# Patient Record
Sex: MALE | Race: WHITE | NOT HISPANIC OR LATINO
[De-identification: names, ages, dates, MRNs, and addresses within clinical notes are randomized per-mention and may not be internally consistent; named-entity substitution may affect disease eponyms.]

---

## 2023-12-07 ENCOUNTER — APPOINTMENT (OUTPATIENT)
Dept: FAMILY MEDICINE | Facility: CLINIC | Age: 88
End: 2023-12-07
Payer: MEDICARE

## 2023-12-07 ENCOUNTER — NON-APPOINTMENT (OUTPATIENT)
Age: 88
End: 2023-12-07

## 2023-12-07 VITALS
DIASTOLIC BLOOD PRESSURE: 70 MMHG | HEIGHT: 68 IN | HEART RATE: 65 BPM | WEIGHT: 173 LBS | OXYGEN SATURATION: 97 % | BODY MASS INDEX: 26.22 KG/M2 | SYSTOLIC BLOOD PRESSURE: 130 MMHG

## 2023-12-07 DIAGNOSIS — Z85.46 PERSONAL HISTORY OF MALIGNANT NEOPLASM OF PROSTATE: ICD-10-CM

## 2023-12-07 DIAGNOSIS — E78.5 HYPERLIPIDEMIA, UNSPECIFIED: ICD-10-CM

## 2023-12-07 DIAGNOSIS — R05.9 COUGH, UNSPECIFIED: ICD-10-CM

## 2023-12-07 DIAGNOSIS — Z86.711 PERSONAL HISTORY OF PULMONARY EMBOLISM: ICD-10-CM

## 2023-12-07 DIAGNOSIS — R21 RASH AND OTHER NONSPECIFIC SKIN ERUPTION: ICD-10-CM

## 2023-12-07 DIAGNOSIS — E03.9 HYPOTHYROIDISM, UNSPECIFIED: ICD-10-CM

## 2023-12-07 DIAGNOSIS — L85.3 XEROSIS CUTIS: ICD-10-CM

## 2023-12-07 DIAGNOSIS — R32 UNSPECIFIED URINARY INCONTINENCE: ICD-10-CM

## 2023-12-07 DIAGNOSIS — Z82.49 FAMILY HISTORY OF ISCHEMIC HEART DISEASE AND OTHER DISEASES OF THE CIRCULATORY SYSTEM: ICD-10-CM

## 2023-12-07 DIAGNOSIS — R09.81 NASAL CONGESTION: ICD-10-CM

## 2023-12-07 DIAGNOSIS — K57.32 DIVERTICULITIS OF LARGE INTESTINE W/OUT PERFORATION OR ABSCESS W/OUT BLEEDING: ICD-10-CM

## 2023-12-07 DIAGNOSIS — E80.6 OTHER DISORDERS OF BILIRUBIN METABOLISM: ICD-10-CM

## 2023-12-07 DIAGNOSIS — I25.2 OLD MYOCARDIAL INFARCTION: ICD-10-CM

## 2023-12-07 DIAGNOSIS — N20.0 CALCULUS OF KIDNEY: ICD-10-CM

## 2023-12-07 DIAGNOSIS — B37.7 CANDIDAL SEPSIS: ICD-10-CM

## 2023-12-07 DIAGNOSIS — R26.89 OTHER ABNORMALITIES OF GAIT AND MOBILITY: ICD-10-CM

## 2023-12-07 DIAGNOSIS — Z00.00 ENCOUNTER FOR GENERAL ADULT MEDICAL EXAMINATION W/OUT ABNORMAL FINDINGS: ICD-10-CM

## 2023-12-07 DIAGNOSIS — H01.009 UNSPECIFIED BLEPHARITIS UNSPECIFIED EYE, UNSPECIFIED EYELID: ICD-10-CM

## 2023-12-07 DIAGNOSIS — Z82.3 FAMILY HISTORY OF STROKE: ICD-10-CM

## 2023-12-07 DIAGNOSIS — F03.90 UNSPECIFIED DEMENTIA W/OUT BEHAVIORAL DISTURBANCE: ICD-10-CM

## 2023-12-07 PROCEDURE — 36415 COLL VENOUS BLD VENIPUNCTURE: CPT

## 2023-12-07 PROCEDURE — 99204 OFFICE O/P NEW MOD 45 MIN: CPT

## 2023-12-07 PROCEDURE — G0439: CPT

## 2023-12-08 ENCOUNTER — TRANSCRIPTION ENCOUNTER (OUTPATIENT)
Age: 88
End: 2023-12-08

## 2023-12-29 ENCOUNTER — APPOINTMENT (OUTPATIENT)
Dept: FAMILY MEDICINE | Facility: CLINIC | Age: 88
End: 2023-12-29
Payer: MEDICARE

## 2023-12-29 VITALS
DIASTOLIC BLOOD PRESSURE: 70 MMHG | HEIGHT: 68 IN | WEIGHT: 170 LBS | SYSTOLIC BLOOD PRESSURE: 110 MMHG | BODY MASS INDEX: 25.76 KG/M2 | HEART RATE: 80 BPM | OXYGEN SATURATION: 97 %

## 2023-12-29 PROCEDURE — 99213 OFFICE O/P EST LOW 20 MIN: CPT

## 2023-12-29 RX ORDER — KRILL/OM-3/DHA/EPA/PHOSPHO/AST 1000-230MG
81 CAPSULE ORAL
Refills: 0 | Status: ACTIVE | COMMUNITY

## 2024-01-04 NOTE — ASSESSMENT
[FreeTextEntry1] : 89 year old male with history of dementia, complicated diverticulitis with perforation now s/p partial colectomy with colostomy placement in 4/2023 at [facility], HLD, hypothyroidism. 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 CT abd to evaluate for underlying infection  Provided with GI and general surgery referrals for further management.  Return precautions discussed with wife and she is in agreement with plan.

## 2024-01-04 NOTE — HISTORY OF PRESENT ILLNESS
[de-identified] : 89 year old male who presents with wife due to concerns of abdominal bulge relating to colostomy bag. Patient with history of dementia and wife present to provide further history.  States last week noticed right abd bulge associated with 2 days of decreased colostomy output. She gave prune juice and coffee which helped increase stool output.  History of diverticulitis complicated perforated colon now s/p partial colectomy with colostomy placement in 4/2023 at [facility]. Patient and his wife recently moved to the area and wife states they have not had any surgery/GI evaluations since surgery. She is the one maintaining colostomy bag.  Denies any fevers, weight loss, nausea, vomiting, blood in colostomy bag, abdominal pain.

## 2024-01-04 NOTE — PHYSICAL EXAM
[Normal] : normal rate, regular rhythm, normal S1 and S2 and no murmur heard [Soft] : abdomen soft [de-identified] : coloscomy bag in place, no blood/stool output, midline/right sided abdominal hernia present with mild abdominal tenderness, abdominal fullness palpated

## 2024-01-10 ENCOUNTER — RESULT REVIEW (OUTPATIENT)
Age: 89
End: 2024-01-10

## 2024-01-10 ENCOUNTER — APPOINTMENT (OUTPATIENT)
Dept: FAMILY MEDICINE | Facility: CLINIC | Age: 89
End: 2024-01-10

## 2024-01-18 PROBLEM — Z00.00 ENCOUNTER FOR PREVENTIVE HEALTH EXAMINATION: Status: ACTIVE | Noted: 2024-01-18

## 2024-01-24 ENCOUNTER — APPOINTMENT (OUTPATIENT)
Dept: SURGERY | Facility: CLINIC | Age: 89
End: 2024-01-24
Payer: MEDICARE

## 2024-01-24 VITALS
HEART RATE: 64 BPM | BODY MASS INDEX: 25.01 KG/M2 | RESPIRATION RATE: 18 BRPM | HEIGHT: 68 IN | SYSTOLIC BLOOD PRESSURE: 134 MMHG | DIASTOLIC BLOOD PRESSURE: 67 MMHG | OXYGEN SATURATION: 99 % | WEIGHT: 165 LBS

## 2024-01-24 DIAGNOSIS — K43.5 PARASTOMAL HERNIA WITHOUT OBSTRUCTION OR GANGRENE: ICD-10-CM

## 2024-01-24 PROCEDURE — 99204 OFFICE O/P NEW MOD 45 MIN: CPT

## 2024-01-24 RX ORDER — MULTIVIT-MINS/IRON/FOLIC/LYCOP 8-200-600
TABLET ORAL
Refills: 0 | Status: ACTIVE | COMMUNITY

## 2024-01-24 RX ORDER — TAMSULOSIN HYDROCHLORIDE 0.4 MG/1
0.4 CAPSULE ORAL
Refills: 0 | Status: ACTIVE | COMMUNITY

## 2024-01-24 RX ORDER — UBIDECARENONE/VIT E ACET 100MG-5
CAPSULE ORAL
Refills: 0 | Status: ACTIVE | COMMUNITY

## 2024-01-25 LAB
ALBUMIN SERPL ELPH-MCNC: 4 G/DL
ALP BLD-CCNC: 102 U/L
ALT SERPL-CCNC: 15 U/L
ANION GAP SERPL CALC-SCNC: 12 MMOL/L
APPEARANCE: CLEAR
AST SERPL-CCNC: 18 U/L
BACTERIA UR CULT: NORMAL
BACTERIA: NEGATIVE /HPF
BASOPHILS # BLD AUTO: 0.04 K/UL
BASOPHILS NFR BLD AUTO: 0.5 %
BILIRUB SERPL-MCNC: 0.8 MG/DL
BILIRUBIN URINE: NEGATIVE
BLOOD URINE: NEGATIVE
BUN SERPL-MCNC: 32 MG/DL
CALCIUM SERPL-MCNC: 9.4 MG/DL
CAST: 1 /LPF
CHLORIDE SERPL-SCNC: 106 MMOL/L
CHOLEST SERPL-MCNC: 180 MG/DL
CO2 SERPL-SCNC: 26 MMOL/L
COLOR: YELLOW
CREAT SERPL-MCNC: 1.43 MG/DL
EGFR: 45 ML/MIN/1.73M2
EOSINOPHIL # BLD AUTO: 0.39 K/UL
EOSINOPHIL NFR BLD AUTO: 5.1 %
EPITHELIAL CELLS: 0 /HPF
GLUCOSE QUALITATIVE U: NEGATIVE MG/DL
GLUCOSE SERPL-MCNC: 95 MG/DL
HCT VFR BLD CALC: 38.4 %
HDLC SERPL-MCNC: 53 MG/DL
HGB BLD-MCNC: 12.6 G/DL
IMM GRANULOCYTES NFR BLD AUTO: 0.4 %
KETONES URINE: NEGATIVE MG/DL
LDLC SERPL CALC-MCNC: 113 MG/DL
LEUKOCYTE ESTERASE URINE: ABNORMAL
LYMPHOCYTES # BLD AUTO: 1.59 K/UL
LYMPHOCYTES NFR BLD AUTO: 20.8 %
MAN DIFF?: NORMAL
MCHC RBC-ENTMCNC: 31.3 PG
MCHC RBC-ENTMCNC: 32.8 GM/DL
MCV RBC AUTO: 95.3 FL
MICROSCOPIC-UA: NORMAL
MONOCYTES # BLD AUTO: 0.64 K/UL
MONOCYTES NFR BLD AUTO: 8.4 %
NEUTROPHILS # BLD AUTO: 4.95 K/UL
NEUTROPHILS NFR BLD AUTO: 64.8 %
NITRITE URINE: NEGATIVE
NONHDLC SERPL-MCNC: 127 MG/DL
PH URINE: 6
PLATELET # BLD AUTO: 141 K/UL
POTASSIUM SERPL-SCNC: 4.5 MMOL/L
PROT SERPL-MCNC: 7.1 G/DL
PROTEIN URINE: NORMAL MG/DL
PSA FREE FLD-MCNC: NORMAL %
PSA FREE SERPL-MCNC: <0.01 NG/ML
PSA SERPL-MCNC: 0.05 NG/ML
RBC # BLD: 4.03 M/UL
RBC # FLD: 15.8 %
RED BLOOD CELLS URINE: 3 /HPF
SODIUM SERPL-SCNC: 143 MMOL/L
SPECIFIC GRAVITY URINE: 1.02
T3FREE SERPL-MCNC: 2.27 PG/ML
T4 FREE SERPL-MCNC: 1.4 NG/DL
TRIGL SERPL-MCNC: 77 MG/DL
TSH SERPL-ACNC: 5.48 UIU/ML
UROBILINOGEN URINE: 0.2 MG/DL
WBC # FLD AUTO: 7.64 K/UL
WHITE BLOOD CELLS URINE: 126 /HPF

## 2024-01-31 ENCOUNTER — LABORATORY RESULT (OUTPATIENT)
Age: 89
End: 2024-01-31

## 2024-02-01 ENCOUNTER — APPOINTMENT (OUTPATIENT)
Dept: HEART AND VASCULAR | Facility: CLINIC | Age: 89
End: 2024-02-01
Payer: MEDICARE

## 2024-02-01 ENCOUNTER — NON-APPOINTMENT (OUTPATIENT)
Age: 89
End: 2024-02-01

## 2024-02-01 VITALS
HEART RATE: 68 BPM | WEIGHT: 165 LBS | OXYGEN SATURATION: 98 % | BODY MASS INDEX: 25.01 KG/M2 | HEIGHT: 68 IN | DIASTOLIC BLOOD PRESSURE: 60 MMHG | SYSTOLIC BLOOD PRESSURE: 86 MMHG

## 2024-02-01 VITALS — SYSTOLIC BLOOD PRESSURE: 100 MMHG | DIASTOLIC BLOOD PRESSURE: 60 MMHG

## 2024-02-01 VITALS — SYSTOLIC BLOOD PRESSURE: 96 MMHG | DIASTOLIC BLOOD PRESSURE: 62 MMHG

## 2024-02-01 DIAGNOSIS — Z86.711 PERSONAL HISTORY OF PULMONARY EMBOLISM: ICD-10-CM

## 2024-02-01 DIAGNOSIS — I25.10 ATHEROSCLEROTIC HEART DISEASE OF NATIVE CORONARY ARTERY W/OUT ANGINA PECTORIS: ICD-10-CM

## 2024-02-01 PROCEDURE — 99205 OFFICE O/P NEW HI 60 MIN: CPT

## 2024-02-01 PROCEDURE — 93000 ELECTROCARDIOGRAM COMPLETE: CPT

## 2024-02-01 RX ORDER — PRAVASTATIN SODIUM 10 MG/1
10 TABLET ORAL
Refills: 0 | Status: DISCONTINUED | COMMUNITY
End: 2024-02-01

## 2024-02-01 NOTE — ASSESSMENT
[FreeTextEntry1] : 89M   CAD s/p CABG 1991.  Hx of MI at that time.  No coronary interventions since CABG.  Hypotension - unclear etiology.  Likely due to dehydration.  Per family PO intake has been good.  Orthostatics in office were positive ~ 20mmHg drop in SBP noted.    EKG today NSR, RBBB, LAFB, borderline 1st deg AVB  - check labs today, CKD noted prior Cr 1.4.  Per family no hx of kidney disease.  May warrant ER visit for IV hydration. - update ECHO given possible decline in renal function, hypotension.     - continue aspirin and lipitor 20mg  - recommend increasing PO and water intake, LE compression socks, careful attention to changes in posture.   - obtain prior records.

## 2024-02-01 NOTE — HISTORY OF PRESENT ILLNESS
[FreeTextEntry1] : 89M CAD with MI s/p CABG X 3 1991, HLD, dementia.    Here to establish care, moved from [facility]. Walks with walker, no chest pain or sob. No leg swelling.  No palps/dizziness. Uses it for stability, does have hx of falls.  Has had episodes of syncope in the past, attributed to vasovagal events per family.  Per wife/daughter he has chronic fatigue, no new decline. No hx of CVA.  Has progressing dementia.    Hx of colon surgery April 2023 for diverticulitis now with ostomy.  Complicated by PE s/p course of Eliquis. He has had no repeat coronary interventions since CABG.  No hx of heart failure.    Non smoker No etoh use

## 2024-02-01 NOTE — REVIEW OF SYSTEMS
[Feeling Fatigued] : feeling fatigued [SOB] : no shortness of breath [Dyspnea on exertion] : not dyspnea during exertion [Chest Discomfort] : no chest discomfort [Lower Ext Edema] : no extremity edema [Leg Claudication] : no intermittent leg claudication [Palpitations] : no palpitations [Orthopnea] : no orthopnea [Abdominal Pain] : no abdominal pain

## 2024-02-08 LAB
ALBUMIN SERPL ELPH-MCNC: 3.5 G/DL
ALP BLD-CCNC: 90 U/L
ALT SERPL-CCNC: 12 U/L
ANION GAP SERPL CALC-SCNC: 8 MMOL/L
AST SERPL-CCNC: 19 U/L
BILIRUB SERPL-MCNC: 0.7 MG/DL
BUN SERPL-MCNC: 27 MG/DL
CALCIUM SERPL-MCNC: 9.3 MG/DL
CHLORIDE SERPL-SCNC: 106 MMOL/L
CO2 SERPL-SCNC: 28 MMOL/L
CREAT SERPL-MCNC: 1.44 MG/DL
EGFR: 45 ML/MIN/1.73M2
GLUCOSE SERPL-MCNC: 101 MG/DL
NT-PROBNP SERPL-MCNC: 957 PG/ML
POTASSIUM SERPL-SCNC: 5 MMOL/L
PROT SERPL-MCNC: 6.9 G/DL
SODIUM SERPL-SCNC: 142 MMOL/L
TSH SERPL-ACNC: 4.4 UIU/ML

## 2024-02-15 ENCOUNTER — APPOINTMENT (OUTPATIENT)
Dept: FAMILY MEDICINE | Facility: CLINIC | Age: 89
End: 2024-02-15
Payer: MEDICARE

## 2024-02-15 ENCOUNTER — LABORATORY RESULT (OUTPATIENT)
Age: 89
End: 2024-02-15

## 2024-02-15 VITALS
BODY MASS INDEX: 25.01 KG/M2 | OXYGEN SATURATION: 96 % | HEART RATE: 53 BPM | SYSTOLIC BLOOD PRESSURE: 120 MMHG | HEIGHT: 68 IN | WEIGHT: 165 LBS | DIASTOLIC BLOOD PRESSURE: 70 MMHG

## 2024-02-15 DIAGNOSIS — F03.90 UNSPECIFIED DEMENTIA W/OUT BEHAVIORAL DISTURBANCE: ICD-10-CM

## 2024-02-15 PROCEDURE — G2211 COMPLEX E/M VISIT ADD ON: CPT

## 2024-02-15 PROCEDURE — 36415 COLL VENOUS BLD VENIPUNCTURE: CPT

## 2024-02-15 PROCEDURE — 99213 OFFICE O/P EST LOW 20 MIN: CPT

## 2024-02-15 NOTE — HISTORY OF PRESENT ILLNESS
[de-identified] : 89 yr old M presents for follow up of Urinary frequency and elevated leukocytes in urine no improvement form adjustment in tamsulosin timing  has orthostatic hypotension noted from cardiology- likely from tamsulosin   TSH repeat - with cards shows only slight elevated tsh   diagnosed with prostate cancer - had hormone therapy and had radiation - free of prostate cancer   frequency is still present

## 2024-02-15 NOTE — ASSESSMENT
[FreeTextEntry1] : hypotension - orthostatic - noted in cardiology visit - recommend reduction in tamsulosin dose rather than increased fluid intake given association with incontinence and frequency which a is a concern for the patient and his wife - monitor on medication  urinary incontinence - recheck urine given leukocytes  - consider the cause to be related to the radiation history of 2/2 to irritation - nml psa- less likely prostatitis - recheck the leukocytes and urine culture again - have discussed seeing urology but concern for travel with patient - consider if improvement will occur in tamsulosin adjustment

## 2024-02-16 ENCOUNTER — APPOINTMENT (OUTPATIENT)
Dept: GASTROENTEROLOGY | Facility: CLINIC | Age: 89
End: 2024-02-16

## 2024-02-19 LAB
APPEARANCE: CLEAR
BACTERIA UR CULT: NORMAL
BILIRUBIN URINE: NEGATIVE
BLOOD URINE: ABNORMAL
COLOR: YELLOW
GLUCOSE QUALITATIVE U: NEGATIVE MG/DL
KETONES URINE: NEGATIVE MG/DL
LEUKOCYTE ESTERASE URINE: ABNORMAL
NITRITE URINE: NEGATIVE
PH URINE: 5.5
PROTEIN URINE: NORMAL MG/DL
SPECIFIC GRAVITY URINE: 1.02
UROBILINOGEN URINE: 0.2 MG/DL

## 2024-03-25 ENCOUNTER — APPOINTMENT (OUTPATIENT)
Dept: CARDIOLOGY | Facility: CLINIC | Age: 89
End: 2024-03-25
Payer: MEDICARE

## 2024-03-25 PROCEDURE — 93306 TTE W/DOPPLER COMPLETE: CPT

## 2024-04-02 ENCOUNTER — APPOINTMENT (OUTPATIENT)
Dept: SURGERY | Facility: CLINIC | Age: 89
End: 2024-04-02
Payer: MEDICARE

## 2024-04-02 VITALS
DIASTOLIC BLOOD PRESSURE: 67 MMHG | HEART RATE: 60 BPM | BODY MASS INDEX: 25.01 KG/M2 | SYSTOLIC BLOOD PRESSURE: 139 MMHG | WEIGHT: 165 LBS | OXYGEN SATURATION: 99 % | HEIGHT: 68 IN

## 2024-04-02 DIAGNOSIS — K43.2 INCISIONAL HERNIA W/OUT OBSTRUCTION OR GANGRENE: ICD-10-CM

## 2024-04-02 DIAGNOSIS — R15.9 FULL INCONTINENCE OF FECES: ICD-10-CM

## 2024-04-02 PROBLEM — K43.5 PARASTOMAL HERNIA: Status: ACTIVE | Noted: 2023-12-29

## 2024-04-02 PROCEDURE — 99214 OFFICE O/P EST MOD 30 MIN: CPT

## 2024-04-02 NOTE — HISTORY OF PRESENT ILLNESS
[FreeTextEntry1] : 89-year-old male returns for follow-up with end colostomy after prior Silva's.  Patient overall doing well.  No issues with the incisional hernia or the colostomy itself.  Recently had some stool output per rectum seen in his diapers.  Family was concerned that something had reconnected to the rectum causing stool to come out.  Reassured the patient and family that this is normal residual from the rectal stump.  This may occur every month or 2.  If becomes particular frequent can consider enemas.  Family hesitant to consider colostomy closure given patient's age and comorbidities, including dementia.  Expressed interest in seeing our nurse practitioner to review stoma care and see if anything can be improved at home as they are sometimes having trouble with changing the flange once a week.

## 2024-04-02 NOTE — ASSESSMENT
Rt side Facial swelling x 1 week.        [FreeTextEntry1] : 85-year-old male here for follow-up for prior colostomy from Silva's for perforated diverticulitis.  Having some leakage per rectum.  Reassured the family that this is normal and will likely happen every month or 2.  If it is particularly bothersome or frequent can consider enemas on an as-needed basis to help clear the rectum of any residual buildup.  Large incisional hernia seems stable.  No obstructive symptoms.  Would monitor for now.  Patient will likely keep colostomy given his age and dementia there would be potentially issues with perioperative risk as well as long-term function with subsequent soiling if he is unable to get to the bathroom quickly.  Recommend patient and family follow-up with ostomy nurse for additional teaching and reassurance.  If there is significant issues with pouching sometimes can get additional flanges to get them through the month with supplies.  Follow-up with me as needed if any questions or concerns.

## 2024-04-02 NOTE — PHYSICAL EXAM
[Abdomen Masses] : No abdominal masses [JVD] : no jugular venous distention  [Respiratory Effort] : normal respiratory effort [No Rash or Lesion] : No rash or lesion [Calm] : calm [Alert] : alert [de-identified] : Left-sided colostomy in place.  Large incisional hernia present. [de-identified] : No acute distress

## 2024-04-02 NOTE — ASSESSMENT
[FreeTextEntry1] : 89-year-old male here for initial valuation for colostomy after emergent Silva's for perforated diverticulitis.  Patient overall doing relatively well.  Currently no issues with pouching.  Discussed management of incisional and parastomal hernias.  Ideal management is reversal of the colostomy to remove the potential issues of parastomal hernia and incarceration.  Patient is currently 89 years old so there is significant risk for major surgery and prolonged anesthesia.  He is still making some gains since his hospitalization with regards to physical status and strength.  If patient is having issues with pouching can follow-up with our nurse practitioner who is certified ostomy wound care nurse for TIPS and improved pouching.  Can consider options of closure of colostomy and potential risks of surgery.  Follow-up as needed and 6 to 8 weeks to discuss further.

## 2024-04-02 NOTE — HISTORY OF PRESENT ILLNESS
[FreeTextEntry1] : 89-year-old male here for initial evaluation for colostomy and parastomal hernia.  Patient underwent emergent Silva's procedure for perforated diverticulitis at outside hospital.  Had a somewhat prolonged recovery.  Doing better now at home.  Still with some weakness and physical mutations.  Colostomy in place has been functioning well.  Has noted some swelling around the colostomy and was referred here for assessment for incisional hernia versus parastomal hernia.  Currently no issues with pouching colostomy.  Patient expressed some interest in considering colostomy reversal although they are concerned about his age and overall health.

## 2024-04-02 NOTE — DATA REVIEWED
[FreeTextEntry1] : CT abdomen pelvis reviewed.  Large incisional hernia through the prior midline incision.  Colostomy in place.

## 2024-04-02 NOTE — PHYSICAL EXAM
[Abdomen Masses] : No abdominal masses [JVD] : no jugular venous distention  [Respiratory Effort] : normal respiratory effort [No Rash or Lesion] : No rash or lesion [Alert] : alert [Calm] : calm [de-identified] : Large incisional hernia in the midline soft reducible.  Colostomy in place pink and functioning.  No issues with leaks from the stoma. [de-identified] : No acute distress

## 2024-04-08 ENCOUNTER — APPOINTMENT (OUTPATIENT)
Dept: COLORECTAL SURGERY | Facility: CLINIC | Age: 89
End: 2024-04-08
Payer: MEDICARE

## 2024-04-08 ENCOUNTER — NON-APPOINTMENT (OUTPATIENT)
Age: 89
End: 2024-04-08

## 2024-04-08 VITALS
WEIGHT: 165 LBS | DIASTOLIC BLOOD PRESSURE: 74 MMHG | HEIGHT: 68 IN | BODY MASS INDEX: 25.01 KG/M2 | SYSTOLIC BLOOD PRESSURE: 125 MMHG | HEART RATE: 66 BPM

## 2024-04-08 PROCEDURE — 99215 OFFICE O/P EST HI 40 MIN: CPT

## 2024-04-08 NOTE — PHYSICAL EXAM
[Exam Deferred] : exam was deferred [No Rash or Lesion] : No rash or lesion [Alert] : alert [Oriented to Person] : oriented to person [Oriented to Place] : oriented to place [Oriented to Time] : oriented to time [Calm] : calm [de-identified] : Abdomen soft incisional hernia present, Left side colostomy stoma beefy and slightly budded 1 1/4" functioning for soft pasty stool.  Peristomal skin slightly red [de-identified] : Normal [de-identified] : Normal [de-identified] : Normal [de-identified] : Normal

## 2024-04-08 NOTE — ASSESSMENT
[FreeTextEntry1] : 89-year-old male pt with colostomy that his wife takes care of. Stoma beefy and budded Peristomal skin with slight redness, stoma powder and barrier spray applied. Reviewed application steps with the wife. Applied the flange followed by the closed end pouch and [employer] barrier extenders.- Samples provided to the wife Called [facility] on pt's behalf and spoke to Carli, who clarified that Pt receives 4 boxes of wafer per month not 4 wafers Also added the [employer] barrier extenders to their list of supplies to be delivered All questions answered. Reassured pt's wife that her she is going a good job with changing the ostomy appliance Plan: Follow up with me as needed

## 2024-04-08 NOTE — HISTORY OF PRESENT ILLNESS
[FreeTextEntry1] : 89-year-old male pt s/p 4/23/23 exploratory laparotomy, abdominal washout, sigmoid colon resection, end colostomy for perforated sigmoid diverticulitis at [facility].  Has incisional hernia.  Here for ostomy teaching and stoma care Wife has been taking care of the colostomy  Pt is currently wearing [employer] Convex wafer #[id number] pre- cut 1 1/4" and closed end pouch # [id number] Uses the stoma powder and barrier spray She gets his supplies from [employer] and reports that she is only allotted 4 wafers a month Pt's wife reports that she occasionally has leaks and runs out of supplies Changes the pouch twice a day and last changed the flange on Saturday. Appetite is good Denies nausea or vomiting Hasn't had any more stool per rectum since his visit with Dr. Seals last week Denies fevers or chills Walks with a walker

## 2024-04-18 ENCOUNTER — NON-APPOINTMENT (OUTPATIENT)
Age: 89
End: 2024-04-18

## 2024-05-14 ENCOUNTER — APPOINTMENT (OUTPATIENT)
Dept: FAMILY MEDICINE | Facility: CLINIC | Age: 89
End: 2024-05-14
Payer: MEDICARE

## 2024-05-14 VITALS
OXYGEN SATURATION: 97 % | SYSTOLIC BLOOD PRESSURE: 110 MMHG | WEIGHT: 165 LBS | HEART RATE: 74 BPM | DIASTOLIC BLOOD PRESSURE: 70 MMHG | HEIGHT: 68 IN | BODY MASS INDEX: 25.01 KG/M2

## 2024-05-14 DIAGNOSIS — R32 UNSPECIFIED URINARY INCONTINENCE: ICD-10-CM

## 2024-05-14 DIAGNOSIS — E03.9 HYPOTHYROIDISM, UNSPECIFIED: ICD-10-CM

## 2024-05-14 DIAGNOSIS — I95.9 HYPOTENSION, UNSPECIFIED: ICD-10-CM

## 2024-05-14 DIAGNOSIS — Z93.3 COLOSTOMY STATUS: ICD-10-CM

## 2024-05-14 DIAGNOSIS — Z95.1 PRESENCE OF AORTOCORONARY BYPASS GRAFT: ICD-10-CM

## 2024-05-14 PROCEDURE — G2211 COMPLEX E/M VISIT ADD ON: CPT

## 2024-05-14 PROCEDURE — 99214 OFFICE O/P EST MOD 30 MIN: CPT

## 2024-05-14 PROCEDURE — 36415 COLL VENOUS BLD VENIPUNCTURE: CPT

## 2024-05-14 RX ORDER — LEVOTHYROXINE SODIUM 88 UG/1
88 TABLET ORAL DAILY
Refills: 0 | Status: ACTIVE | COMMUNITY

## 2024-05-14 NOTE — HISTORY OF PRESENT ILLNESS
[de-identified] : 89-year-old maleWith a history of colostomy, CAD, dementia, presents today for follow-up Urination  - urinating a lot  - slowed down on Flomax to 3 days a week  - everything seems the same  - urinating frequently  - notes manageable at this time   Hypotension History of hypotension less dizzy at this time No significant signs of falls or lightheadedness after the reduction of tamsulosin

## 2024-05-14 NOTE — ASSESSMENT
[FreeTextEntry1] : Urine Patient is noting that the urine is manageable His wife is noting the same Continue to monitor  History of feces from rectum Status post colostomy Discussed possible causes Likely benign Continue to monitor  Hypothyroidism Continue to monitor TSH

## 2024-05-15 LAB
ANION GAP SERPL CALC-SCNC: 14 MMOL/L
BUN SERPL-MCNC: 27 MG/DL
CALCIUM SERPL-MCNC: 8.9 MG/DL
CHLORIDE SERPL-SCNC: 108 MMOL/L
CO2 SERPL-SCNC: 24 MMOL/L
CREAT SERPL-MCNC: 1.54 MG/DL
EGFR: 41 ML/MIN/1.73M2
GLUCOSE SERPL-MCNC: 88 MG/DL
POTASSIUM SERPL-SCNC: 4.6 MMOL/L
SODIUM SERPL-SCNC: 146 MMOL/L
T3FREE SERPL-MCNC: 2.04 PG/ML
T4 FREE SERPL-MCNC: 1.5 NG/DL
TSH SERPL-ACNC: 3.05 UIU/ML

## 2024-05-20 LAB
APPEARANCE: ABNORMAL
BACTERIA: NEGATIVE /HPF
BILIRUBIN URINE: NEGATIVE
BLOOD URINE: ABNORMAL
CAST: 1 /LPF
COLOR: YELLOW
EPITHELIAL CELLS: 1 /HPF
GLUCOSE QUALITATIVE U: NEGATIVE MG/DL
KETONES URINE: NEGATIVE MG/DL
LEUKOCYTE ESTERASE URINE: ABNORMAL
MICROSCOPIC-UA: NORMAL
NITRITE URINE: NEGATIVE
PH URINE: 5.5
PROTEIN URINE: 30 MG/DL
RED BLOOD CELLS URINE: 0 /HPF
SPECIFIC GRAVITY URINE: 1.02
UROBILINOGEN URINE: 0.2 MG/DL
WHITE BLOOD CELLS URINE: 222 /HPF

## 2024-05-21 DIAGNOSIS — R35.0 FREQUENCY OF MICTURITION: ICD-10-CM

## 2024-05-21 RX ORDER — CIPROFLOXACIN HYDROCHLORIDE 500 MG/1
500 TABLET, FILM COATED ORAL TWICE DAILY
Qty: 10 | Refills: 0 | Status: ACTIVE | COMMUNITY
Start: 2024-05-21 | End: 1900-01-01

## 2024-05-22 RX ORDER — ATORVASTATIN CALCIUM 40 MG/1
40 TABLET, FILM COATED ORAL
Qty: 90 | Refills: 1 | Status: ACTIVE | COMMUNITY
Start: 1900-01-01 | End: 1900-01-01

## 2024-08-01 ENCOUNTER — NON-APPOINTMENT (OUTPATIENT)
Age: 89
End: 2024-08-01

## 2024-08-01 ENCOUNTER — APPOINTMENT (OUTPATIENT)
Dept: HEART AND VASCULAR | Facility: CLINIC | Age: 89
End: 2024-08-01
Payer: MEDICARE

## 2024-08-01 VITALS
HEIGHT: 68 IN | WEIGHT: 165 LBS | DIASTOLIC BLOOD PRESSURE: 82 MMHG | SYSTOLIC BLOOD PRESSURE: 128 MMHG | BODY MASS INDEX: 25.01 KG/M2

## 2024-08-01 DIAGNOSIS — Z86.711 PERSONAL HISTORY OF PULMONARY EMBOLISM: ICD-10-CM

## 2024-08-01 DIAGNOSIS — Z95.1 PRESENCE OF AORTOCORONARY BYPASS GRAFT: ICD-10-CM

## 2024-08-01 DIAGNOSIS — F03.90 UNSPECIFIED DEMENTIA W/OUT BEHAVIORAL DISTURBANCE: ICD-10-CM

## 2024-08-01 DIAGNOSIS — I50.22 CHRONIC SYSTOLIC (CONGESTIVE) HEART FAILURE: ICD-10-CM

## 2024-08-01 DIAGNOSIS — I25.10 ATHEROSCLEROTIC HEART DISEASE OF NATIVE CORONARY ARTERY W/OUT ANGINA PECTORIS: ICD-10-CM

## 2024-08-01 PROCEDURE — G2211 COMPLEX E/M VISIT ADD ON: CPT

## 2024-08-01 PROCEDURE — 93000 ELECTROCARDIOGRAM COMPLETE: CPT

## 2024-08-01 PROCEDURE — 99214 OFFICE O/P EST MOD 30 MIN: CPT

## 2024-08-01 NOTE — HISTORY OF PRESENT ILLNESS
[FreeTextEntry1] : 89M CAD with MI s/p CABG X 3 1991, HLD, dementia.    Here to establish care, moved from [facility]. Walks with walker, no chest pain or sob. No leg swelling.  No palps/dizziness. Uses it for stability, does have hx of falls.  Has had episodes of syncope in the past, attributed to vasovagal events per family.  Per wife/daughter he has chronic fatigue, no new decline. No hx of CVA.  Has progressing dementia.    Hx of colon surgery April 2023 for diverticulitis now with ostomy.  Complicated by PE s/p course of Eliquis. He has had no repeat coronary interventions since CABG.  No hx of heart failure.    8/1/24:  feeling good, uses walker, no falls.  no cp/sob/palps/LE edema  Non smoker No etoh use

## 2024-08-01 NOTE — ASSESSMENT
[FreeTextEntry1] : 89M   SCHF EF 45% - anteroseptal wma CAD s/p CABG 1991.  Hx of MI at that time.  No coronary interventions since CABG.  Hypotension noted on prior visit - resolved CKD Cr ~ 1.5 Colostomy - perf diverticulitis 2023  EKG today SR, RBBB, LAFB, 1st deg AVB, septal infarct unchanged  ECHO March 2024 - LVEF 45%, anteroseptal wall hypokinetic.  Borderline reduced RV function.  Mod AI.  Aortic root 4.1cm   - sinus lane 1st deg AVB - asymptomatic, will not start beta blocker for systolic HF.  He is well compensated, no signs of ischemia or heart failure.  Doing very well for age.   - continue aspirin and lipitor 20mg

## 2024-10-04 RX ORDER — LEVOTHYROXINE SODIUM 88 UG/1
88 CAPSULE ORAL DAILY
Refills: 0 | Status: ACTIVE | COMMUNITY

## 2025-01-23 ENCOUNTER — LABORATORY RESULT (OUTPATIENT)
Age: 89
End: 2025-01-23

## 2025-01-23 ENCOUNTER — APPOINTMENT (OUTPATIENT)
Age: 89
End: 2025-01-23
Payer: MEDICARE

## 2025-01-23 VITALS
HEIGHT: 68 IN | BODY MASS INDEX: 25.46 KG/M2 | HEART RATE: 69 BPM | DIASTOLIC BLOOD PRESSURE: 70 MMHG | WEIGHT: 168 LBS | SYSTOLIC BLOOD PRESSURE: 110 MMHG | OXYGEN SATURATION: 96 %

## 2025-01-23 DIAGNOSIS — R35.0 FREQUENCY OF MICTURITION: ICD-10-CM

## 2025-01-23 DIAGNOSIS — R05.1 ACUTE COUGH: ICD-10-CM

## 2025-01-23 DIAGNOSIS — Z00.00 ENCOUNTER FOR GENERAL ADULT MEDICAL EXAMINATION W/OUT ABNORMAL FINDINGS: ICD-10-CM

## 2025-01-23 DIAGNOSIS — Z95.1 PRESENCE OF AORTOCORONARY BYPASS GRAFT: ICD-10-CM

## 2025-01-23 DIAGNOSIS — E03.9 HYPOTHYROIDISM, UNSPECIFIED: ICD-10-CM

## 2025-01-23 PROCEDURE — 36415 COLL VENOUS BLD VENIPUNCTURE: CPT

## 2025-01-23 PROCEDURE — G0439: CPT

## 2025-01-23 RX ORDER — OMEPRAZOLE 20 MG/1
20 CAPSULE, DELAYED RELEASE ORAL
Qty: 90 | Refills: 3 | Status: ACTIVE | COMMUNITY
Start: 2025-01-23 | End: 1900-01-01

## 2025-01-28 LAB
ALBUMIN SERPL ELPH-MCNC: 4 G/DL
ALP BLD-CCNC: 98 U/L
ALT SERPL-CCNC: 16 U/L
ANION GAP SERPL CALC-SCNC: 10 MMOL/L
APPEARANCE: CLEAR
AST SERPL-CCNC: 21 U/L
BACTERIA UR CULT: NORMAL
BASOPHILS # BLD AUTO: 0.03 K/UL
BASOPHILS NFR BLD AUTO: 0.4 %
BILIRUB SERPL-MCNC: 1.2 MG/DL
BILIRUBIN URINE: NEGATIVE
BLOOD URINE: ABNORMAL
BUN SERPL-MCNC: 29 MG/DL
CALCIUM SERPL-MCNC: 9.8 MG/DL
CHLORIDE SERPL-SCNC: 107 MMOL/L
CHOLEST SERPL-MCNC: 178 MG/DL
CO2 SERPL-SCNC: 27 MMOL/L
COLOR: YELLOW
CREAT SERPL-MCNC: 1.47 MG/DL
EGFR: 43 ML/MIN/1.73M2
EOSINOPHIL # BLD AUTO: 0.3 K/UL
EOSINOPHIL NFR BLD AUTO: 3.5 %
GLUCOSE QUALITATIVE U: NEGATIVE MG/DL
GLUCOSE SERPL-MCNC: 96 MG/DL
HCT VFR BLD CALC: 40.7 %
HDLC SERPL-MCNC: 54 MG/DL
HGB BLD-MCNC: 13.7 G/DL
IMM GRANULOCYTES NFR BLD AUTO: 0.4 %
KETONES URINE: NEGATIVE MG/DL
LDLC SERPL CALC-MCNC: 108 MG/DL
LEUKOCYTE ESTERASE URINE: ABNORMAL
LYMPHOCYTES # BLD AUTO: 1.79 K/UL
LYMPHOCYTES NFR BLD AUTO: 20.9 %
MAN DIFF?: NORMAL
MCHC RBC-ENTMCNC: 32.1 PG
MCHC RBC-ENTMCNC: 33.7 G/DL
MCV RBC AUTO: 95.3 FL
MONOCYTES # BLD AUTO: 0.57 K/UL
MONOCYTES NFR BLD AUTO: 6.7 %
NEUTROPHILS # BLD AUTO: 5.85 K/UL
NEUTROPHILS NFR BLD AUTO: 68.1 %
NITRITE URINE: NEGATIVE
NONHDLC SERPL-MCNC: 124 MG/DL
PH URINE: 5.5
PLATELET # BLD AUTO: 177 K/UL
POTASSIUM SERPL-SCNC: 4.9 MMOL/L
PROT SERPL-MCNC: 7.3 G/DL
PROTEIN URINE: NORMAL MG/DL
RBC # BLD: 4.27 M/UL
RBC # FLD: 14.2 %
SODIUM SERPL-SCNC: 144 MMOL/L
SPECIFIC GRAVITY URINE: 1.02
T3FREE SERPL-MCNC: 2.7 PG/ML
T4 FREE SERPL-MCNC: 1.6 NG/DL
TRIGL SERPL-MCNC: 87 MG/DL
TSH SERPL-ACNC: 3.31 UIU/ML
UROBILINOGEN URINE: 0.2 MG/DL
WBC # FLD AUTO: 8.57 K/UL

## 2025-01-29 ENCOUNTER — RESULT REVIEW (OUTPATIENT)
Age: 89
End: 2025-01-29

## 2025-07-24 ENCOUNTER — RX RENEWAL (OUTPATIENT)
Age: 89
End: 2025-07-24

## 2025-07-24 RX ORDER — LEVOTHYROXINE SODIUM 0.09 MG/1
88 TABLET ORAL
Qty: 90 | Refills: 3 | Status: ACTIVE | COMMUNITY
Start: 2025-07-24 | End: 1900-01-01

## 2025-08-06 ENCOUNTER — APPOINTMENT (OUTPATIENT)
Dept: FAMILY MEDICINE | Facility: CLINIC | Age: 89
End: 2025-08-06
Payer: MEDICARE

## 2025-08-06 VITALS
HEIGHT: 68 IN | HEART RATE: 67 BPM | OXYGEN SATURATION: 99 % | SYSTOLIC BLOOD PRESSURE: 110 MMHG | BODY MASS INDEX: 25.46 KG/M2 | WEIGHT: 168 LBS | DIASTOLIC BLOOD PRESSURE: 70 MMHG

## 2025-08-06 DIAGNOSIS — F03.90 UNSPECIFIED DEMENTIA W/OUT BEHAVIORAL DISTURBANCE: ICD-10-CM

## 2025-08-06 DIAGNOSIS — I50.22 CHRONIC SYSTOLIC (CONGESTIVE) HEART FAILURE: ICD-10-CM

## 2025-08-06 DIAGNOSIS — I95.9 HYPOTENSION, UNSPECIFIED: ICD-10-CM

## 2025-08-06 DIAGNOSIS — E03.9 HYPOTHYROIDISM, UNSPECIFIED: ICD-10-CM

## 2025-08-06 PROCEDURE — 99214 OFFICE O/P EST MOD 30 MIN: CPT

## 2025-08-06 PROCEDURE — G2211 COMPLEX E/M VISIT ADD ON: CPT

## 2025-08-06 PROCEDURE — 36415 COLL VENOUS BLD VENIPUNCTURE: CPT

## 2025-08-07 LAB
ALBUMIN SERPL ELPH-MCNC: 3.7 G/DL
ALP BLD-CCNC: 97 U/L
ALT SERPL-CCNC: 15 U/L
ANION GAP SERPL CALC-SCNC: 13 MMOL/L
AST SERPL-CCNC: 20 U/L
BASOPHILS # BLD AUTO: 0.03 K/UL
BASOPHILS NFR BLD AUTO: 0.4 %
BILIRUB SERPL-MCNC: 1 MG/DL
BUN SERPL-MCNC: 35 MG/DL
CALCIUM SERPL-MCNC: 9.1 MG/DL
CHLORIDE SERPL-SCNC: 107 MMOL/L
CO2 SERPL-SCNC: 24 MMOL/L
CREAT SERPL-MCNC: 1.51 MG/DL
EGFRCR SERPLBLD CKD-EPI 2021: 42 ML/MIN/1.73M2
EOSINOPHIL # BLD AUTO: 0.18 K/UL
EOSINOPHIL NFR BLD AUTO: 2.5 %
GLUCOSE SERPL-MCNC: 141 MG/DL
HCT VFR BLD CALC: 38.4 %
HGB BLD-MCNC: 12.5 G/DL
IMM GRANULOCYTES NFR BLD AUTO: 0.3 %
LYMPHOCYTES # BLD AUTO: 1.33 K/UL
LYMPHOCYTES NFR BLD AUTO: 18.7 %
MAN DIFF?: NORMAL
MCHC RBC-ENTMCNC: 32 PG
MCHC RBC-ENTMCNC: 32.6 G/DL
MCV RBC AUTO: 98.2 FL
MONOCYTES # BLD AUTO: 0.43 K/UL
MONOCYTES NFR BLD AUTO: 6 %
NEUTROPHILS # BLD AUTO: 5.14 K/UL
NEUTROPHILS NFR BLD AUTO: 72.1 %
PLATELET # BLD AUTO: 148 K/UL
POTASSIUM SERPL-SCNC: 4.4 MMOL/L
PROT SERPL-MCNC: 6.7 G/DL
RBC # BLD: 3.91 M/UL
RBC # FLD: 15 %
SODIUM SERPL-SCNC: 144 MMOL/L
T3FREE SERPL-MCNC: 2.22 PG/ML
T4 FREE SERPL-MCNC: 1.5 NG/DL
TSH SERPL-ACNC: 1.94 UIU/ML
WBC # FLD AUTO: 7.13 K/UL